# Patient Record
Sex: MALE | Race: WHITE | NOT HISPANIC OR LATINO | Employment: OTHER | URBAN - METROPOLITAN AREA
[De-identification: names, ages, dates, MRNs, and addresses within clinical notes are randomized per-mention and may not be internally consistent; named-entity substitution may affect disease eponyms.]

---
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
## 2023-03-20 ENCOUNTER — TELEPHONE (OUTPATIENT)
Dept: PEDIATRICS | Facility: CLINIC | Age: 18
End: 2023-03-20

## 2023-03-20 NOTE — TELEPHONE ENCOUNTER
Pt mom messaged to schedule a well check appointment. Will you still see him , for a final well check, since he is now 17?

## 2023-04-10 ENCOUNTER — TELEPHONE (OUTPATIENT)
Dept: PEDIATRICS | Facility: CLINIC | Age: 18
End: 2023-04-10
Payer: MEDICAID

## 2023-04-10 NOTE — TELEPHONE ENCOUNTER
Spoke to pt grandmother. Appointment will be rescheduled as requested to 4/20 at 8:40am. Verbalized understanding

## 2023-04-10 NOTE — TELEPHONE ENCOUNTER
----- Message from Migel Macias sent at 4/10/2023 12:03 PM CDT -----  Regarding: reschedule  Contact: NO GARCIA [[id number]]  Type:  Sooner Appointment Request    Caller is requesting a sooner appointment.  Caller declined first available appointment listed below.  Caller will not accept being placed on the waitlist and is requesting a message be sent to doctor.    Name of Caller:  Morena, Grandmother    When is the first available appointment?  Dept book due to visit modifiers    Symptoms:  Well Child    Best Call Back Number:  [phone]    Additional Information: Please call to advise.

## 2023-04-20 ENCOUNTER — PATIENT MESSAGE (OUTPATIENT)
Dept: PEDIATRICS | Facility: CLINIC | Age: 18
End: 2023-04-20

## 2023-04-20 ENCOUNTER — OFFICE VISIT (OUTPATIENT)
Dept: PEDIATRICS | Facility: CLINIC | Age: 18
End: 2023-04-20
Payer: MEDICAID

## 2023-04-20 VITALS
BODY MASS INDEX: 23.43 KG/M2 | HEART RATE: 89 BPM | RESPIRATION RATE: 19 BRPM | WEIGHT: 158.19 LBS | DIASTOLIC BLOOD PRESSURE: 69 MMHG | HEIGHT: 69 IN | SYSTOLIC BLOOD PRESSURE: 110 MMHG

## 2023-04-20 DIAGNOSIS — Z00.00 WELL ADULT EXAM: Primary | ICD-10-CM

## 2023-04-20 DIAGNOSIS — F84.0 AUTISM: ICD-10-CM

## 2023-04-20 PROCEDURE — 99999 PR PBB SHADOW E&M-EST. PATIENT-LVL III: CPT | Mod: PBBFAC,,, | Performed by: PEDIATRICS

## 2023-04-20 PROCEDURE — 3074F PR MOST RECENT SYSTOLIC BLOOD PRESSURE < 130 MM HG: ICD-10-PCS | Mod: CPTII,,, | Performed by: PEDIATRICS

## 2023-04-20 PROCEDURE — 99999 PR PBB SHADOW E&M-EST. PATIENT-LVL III: ICD-10-PCS | Mod: PBBFAC,,, | Performed by: PEDIATRICS

## 2023-04-20 PROCEDURE — 99395 PREV VISIT EST AGE 18-39: CPT | Mod: 25,S$PBB,, | Performed by: PEDIATRICS

## 2023-04-20 PROCEDURE — 92551 PURE TONE HEARING TEST AIR: CPT | Mod: ,,, | Performed by: PEDIATRICS

## 2023-04-20 PROCEDURE — 99395 PR PREVENTIVE VISIT,EST,18-39: ICD-10-PCS | Mod: 25,S$PBB,, | Performed by: PEDIATRICS

## 2023-04-20 PROCEDURE — 99177 PR OCULAR INSTRUMNT SCREEN W/ONSITE ANALYSIS BIL: ICD-10-PCS | Mod: ,,, | Performed by: PEDIATRICS

## 2023-04-20 PROCEDURE — 3008F BODY MASS INDEX DOCD: CPT | Mod: CPTII,,, | Performed by: PEDIATRICS

## 2023-04-20 PROCEDURE — 99213 OFFICE O/P EST LOW 20 MIN: CPT | Mod: PBBFAC,PO | Performed by: PEDIATRICS

## 2023-04-20 PROCEDURE — 99177 OCULAR INSTRUMNT SCREEN BIL: CPT | Mod: ,,, | Performed by: PEDIATRICS

## 2023-04-20 PROCEDURE — 3078F DIAST BP <80 MM HG: CPT | Mod: CPTII,,, | Performed by: PEDIATRICS

## 2023-04-20 PROCEDURE — 3074F SYST BP LT 130 MM HG: CPT | Mod: CPTII,,, | Performed by: PEDIATRICS

## 2023-04-20 PROCEDURE — 92551 PR PURE TONE HEARING TEST, AIR: ICD-10-PCS | Mod: ,,, | Performed by: PEDIATRICS

## 2023-04-20 PROCEDURE — 1159F MED LIST DOCD IN RCRD: CPT | Mod: CPTII,,, | Performed by: PEDIATRICS

## 2023-04-20 PROCEDURE — 1159F PR MEDICATION LIST DOCUMENTED IN MEDICAL RECORD: ICD-10-PCS | Mod: CPTII,,, | Performed by: PEDIATRICS

## 2023-04-20 PROCEDURE — 3078F PR MOST RECENT DIASTOLIC BLOOD PRESSURE < 80 MM HG: ICD-10-PCS | Mod: CPTII,,, | Performed by: PEDIATRICS

## 2023-04-20 PROCEDURE — 3008F PR BODY MASS INDEX (BMI) DOCUMENTED: ICD-10-PCS | Mod: CPTII,,, | Performed by: PEDIATRICS

## 2023-04-20 NOTE — PROGRESS NOTES
"Subjective:       History was provided by the grandfather.    Remington Donnelly is a 17 y.o. male who is here for this well-child visit.    Current Issues:  Current concerns include no complaints. He has autism.  Doing well.  Needs paperwork filled out for medicaid to continue home services once a month.     Review of Nutrition:  Current diet: regular for age  Balanced diet? yes    Social Screening:   Parental relations: lives with maternal grandparents  Sibling relations: sister Isabella  Discipline concerns? no  Concerns regarding behavior with peers? no    Screening Questions:  Risk factors for anemia: no  Risk factors for vision problems: no  Risk factors for hearing problems: no  Risk factors for tuberculosis: no  Risk factors for dyslipidemia: no  Risk factors for sexually-transmitted infections: no  Risk factors for alcohol/drug use:  no    Growth parameters: Noted and are appropriate for age.    Review of Systems  Pertinent items are noted in HPI      Objective:        Vitals:    04/20/23 0833   BP: 110/69   Pulse: 89   Resp: 19   Weight: 71.7 kg (158 lb 2.9 oz)   Height: 5' 8.75" (1.746 m)     General:   alert, appears stated age, and cooperative   Gait:   normal   Skin:   normal   Oral cavity:   lips, mucosa, and tongue normal; teeth and gums normal   Eyes:   sclerae white, pupils equal and reactive, red reflex normal bilaterally   Ears:   normal bilaterally   Neck:   no adenopathy and thyroid not enlarged, symmetric, no tenderness/mass/nodules   Lungs:  clear to auscultation bilaterally   Heart:   regular rate and rhythm, S1, S2 normal, no murmur, click, rub or gallop   Abdomen:  soft, non-tender; bowel sounds normal; no masses,  no organomegaly   :  exam deferred   Phil Stage:   deferred   Extremities:  extremities normal, atraumatic, no cyanosis or edema   Neuro:  Grossly normal        Assessment:     Encounter Diagnoses   Name Primary?    Well adult exam Yes    Autism         Plan:      1. Anticipatory " "guidance discussed.  Specific topics reviewed: importance of regular exercise and importance of varied diet.    2.  Weight management:  The patient was counseled regarding nutrition, physical activity.    3. Immunizations today:  UTD    4.  Paperwork completed for medicaid home services which he gets once a month.  Service is called "[facility]" through medicaid.    "

## 2024-02-23 ENCOUNTER — TELEPHONE (OUTPATIENT)
Dept: PEDIATRICS | Facility: CLINIC | Age: 19
End: 2024-02-23
Payer: MEDICAID

## 2024-02-23 NOTE — TELEPHONE ENCOUNTER
Spoke with guardian, informed her that per Dr. Valdez and Mgmt he would need to be seen at [facility]. Guardian understood  ----- Message from Annia Morse sent at 2/23/2024  7:18 AM CST -----  Regarding: appt request  Contact: mom  Type:  Needs Medical Advice    Who Called: pt  Would the patient rather a call back or a response via [facility]? Call back  Best Call Back Number: [phone]    Additional Information: sts she would like to get him a well check appt Monday 2/26/24 along with his sister who is scheduled for 9 am

## 2024-04-15 NOTE — ANESTHESIA PREPROCEDURE EVALUATION
04/19/2018  Remington Donnelly is a 12 y.o., male.    Anesthesia Evaluation    I have reviewed the Patient Summary Reports.    I have reviewed the Nursing Notes.   I have reviewed the Medications.     Review of Systems  Anesthesia Hx:  No problems with previous Anesthesia    Social:  Non-Smoker    Cardiovascular:  Cardiovascular Normal     Pulmonary:  Pulmonary Normal    Renal/:  Renal/ Normal     Neurological:  Neurology Normal    Endocrine:  Endocrine Normal    Psych:   Psychiatric History Autism  ADHD - untreated         Physical Exam  General:  Well nourished    Airway/Jaw/Neck:  Airway Findings: Mouth Opening: Normal Tongue: Normal  General Airway Assessment: Adult  Oropharynx Findings:  Mallampati: II  Jaw/Neck Findings:  Neck ROM: Normal ROM     Eyes/Ears/Nose:  Eyes/Ears/Nose Findings:    Dental:  Dental Findings:   Chest/Lungs:  Chest/Lungs Findings: Normal Respiratory Rate     Heart/Vascular:  Heart Findings: Rate: Normal  Rhythm: Regular Rhythm        Mental Status:  Mental Status Findings:  Cooperative, Alert and Oriented         Anesthesia Plan  Type of Anesthesia, risks & benefits discussed:  Anesthesia Type:  general  Patient's Preference:   Intra-op Monitoring Plan:   Intra-op Monitoring Plan Comments:   Post Op Pain Control Plan: multimodal analgesia  Post Op Pain Control Plan Comments:   Induction:   IV  Beta Blocker:  Patient is not currently on a Beta-Blocker (No further documentation required).       Informed Consent: Patient understands risks and agrees with Anesthesia plan.  Questions answered. Anesthesia consent signed with patient.  ASA Score: 2     Day of Surgery Review of History & Physical:  There are no significant changes.   H&P completed by Anesthesiologist.       Ready For Surgery From Anesthesia Perspective.       
hide

## 2024-10-08 ENCOUNTER — TELEPHONE (OUTPATIENT)
Dept: FAMILY MEDICINE | Facility: CLINIC | Age: 19
End: 2024-10-08
Payer: MEDICAID

## 2024-10-08 NOTE — TELEPHONE ENCOUNTER
----- Message from Ashley sent at 10/8/2024 11:13 AM CDT -----  Regarding: appointment  Contact: Destiney farmer  Type:  Sooner Appointment Request    Caller is requesting a sooner appointment.  Caller declined first available appointment listed below.  Caller will not accept being placed on the waitlist and is requesting a message be sent to doctor.    Name of Caller:  Destiney mother  When is the first available appointment?    Symptoms:  get established  Would the patient rather a call back or a response via [facility]? call  Best Call Back Number:  [phone] (home)     Additional Information:  Please call mother to advise.  Thanks!

## 2025-03-18 ENCOUNTER — OFFICE VISIT (OUTPATIENT)
Dept: PRIMARY CARE CLINIC | Facility: CLINIC | Age: 20
End: 2025-03-18
Payer: MEDICAID

## 2025-03-18 VITALS
HEART RATE: 81 BPM | DIASTOLIC BLOOD PRESSURE: 68 MMHG | WEIGHT: 162.94 LBS | HEIGHT: 68 IN | SYSTOLIC BLOOD PRESSURE: 115 MMHG | BODY MASS INDEX: 24.7 KG/M2 | OXYGEN SATURATION: 96 %

## 2025-03-18 DIAGNOSIS — F84.0 AUTISM: Primary | ICD-10-CM

## 2025-03-18 DIAGNOSIS — Z00.00 ENCOUNTER FOR ANNUAL PHYSICAL EXAM: ICD-10-CM

## 2025-03-18 DIAGNOSIS — Z11.4 ENCOUNTER FOR SCREENING FOR HUMAN IMMUNODEFICIENCY VIRUS (HIV): ICD-10-CM

## 2025-03-18 DIAGNOSIS — Z11.59 NEED FOR HEPATITIS C SCREENING TEST: ICD-10-CM

## 2025-03-18 PROCEDURE — 3078F DIAST BP <80 MM HG: CPT | Mod: CPTII,,,

## 2025-03-18 PROCEDURE — 99213 OFFICE O/P EST LOW 20 MIN: CPT | Mod: PBBFAC,PN

## 2025-03-18 PROCEDURE — 99204 OFFICE O/P NEW MOD 45 MIN: CPT | Mod: S$PBB,,,

## 2025-03-18 PROCEDURE — 1160F RVW MEDS BY RX/DR IN RCRD: CPT | Mod: CPTII,,,

## 2025-03-18 PROCEDURE — 3074F SYST BP LT 130 MM HG: CPT | Mod: CPTII,,,

## 2025-03-18 PROCEDURE — 1159F MED LIST DOCD IN RCRD: CPT | Mod: CPTII,,,

## 2025-03-18 PROCEDURE — 99999 PR PBB SHADOW E&M-EST. PATIENT-LVL III: CPT | Mod: PBBFAC,,,

## 2025-03-18 PROCEDURE — 3008F BODY MASS INDEX DOCD: CPT | Mod: CPTII,,,

## 2025-03-18 NOTE — PROGRESS NOTES
SUBJECTIVE     Chief Complaint   Patient presents with    [facility]  Remington Donnelly is a 19 y.o. male presents to clinic with mother. Patient with medical diagnoses as listed in the medical history and problem list that presents for annual exam. Pt is new to me. He has a good appetite and eats well. He does exercise. He sleeps for ~7+ hours nightly. Pt does take OTC supplements. He does have any current stressors. Pt is UTD on age appropriate CA screening. Dental exam is not UTD. Eye exam is not UTD.     HPI    History of Present Illness    CHIEF COMPLAINT:  Patient presents today for establishment of care and completion of 90-L form    BEHAVIORAL HEALTH:  Mom reports patient at times becomes easily frustrated when questioned. His diet significantly affects his behavior - when consuming foods that deviate from his restricted diet, he experiences increased frustration, potentially becoming combative, followed by a depressive crash. His mother reports that a gluten-free, protein-free, and chemical-free diet has been beneficial, particularly when he was younger. As he has gotten older, occasional dietary deviations have less severe behavioral impacts.    SLEEP:  He typically sleeps 7 hours per night, going to bed around 10 PM and waking up at 5 AM for school. He occasionally wakes up earlier than scheduled to access electronic devices. He takes melatonin nightly for sleep aid.    MEDICATIONS AND SUPPLEMENTS:  He takes a multivitamin, OPC3 (an antioxidant supplement), vitamin D3, and melatonin.    MEDICAL HISTORY:  He denies any recent hospitalizations. Last labs were performed in 2014, with only basic checkups since then. His last dental exam was over a year ago when wisdom teeth were extracted, and his last eye exam was also performed over a year ago.      ROS:  General: -fever, -chills, -fatigue, -weight gain, -weight loss  Eyes: -vision changes, -redness, -discharge  ENT: -ear pain, -nasal congestion,  "-sore throat  Cardiovascular: -chest pain, -palpitations, -lower extremity edema  Respiratory: -cough, -shortness of breath  Gastrointestinal: -abdominal pain, -nausea, -vomiting, -diarrhea, -constipation, -blood in stool  Genitourinary: -dysuria, -hematuria, -frequency  Musculoskeletal: -joint pain, -muscle pain  Skin: -rash, -lesion  Neurological: -headache, -dizziness, -numbness, -tingling  Psychiatric: -anxiety, -depression, -sleep difficulty, +irritability         PAST MEDICAL HISTORY:  Past Medical History:   Diagnosis Date    ADHD (attention deficit hyperactivity disorder)     Autism spectrum disorder        PAST SURGICAL HISTORY:  Past Surgical History:   Procedure Laterality Date    no family history of anesthesia problems         SOCIAL HISTORY:  Social History[1]    FAMILY HISTORY:  Family History   Problem Relation Name Age of Onset    Psoriasis Paternal Aunt          plaque psoriasis    Lupus Maternal Grandmother      Cancer Other MGGF         asbestosis    Hypertension Other MGGM     Allergies Mother karrie     Kidney disease Sister Susan        ALLERGIES AND MEDICATIONS: updated and reviewed.  Review of patient's allergies indicates:   Allergen Reactions    No known drug allergies      Current Medications[2]      OBJECTIVE     Physical Exam  Vitals:    03/18/25 0920   BP: 115/68   Pulse: 81    Body mass index is 24.77 kg/m².  Weight: 73.9 kg (162 lb 14.7 oz)   Height: 5' 8" (172.7 cm)     Physical Exam  Vitals reviewed.   Constitutional:       General: He is awake. He is not in acute distress.     Appearance: Normal appearance.   HENT:      Right Ear: External ear normal.      Left Ear: External ear normal.      Nose: Nose normal.      Mouth/Throat:      Mouth: Mucous membranes are moist.   Eyes:      Extraocular Movements: Extraocular movements intact.      Conjunctiva/sclera: Conjunctivae normal.      Pupils: Pupils are equal, round, and reactive to light.   Cardiovascular:      Rate and Rhythm: " Normal rate and regular rhythm.      Pulses: Normal pulses.      Heart sounds: Normal heart sounds.   Pulmonary:      Effort: Pulmonary effort is normal.      Breath sounds: Normal breath sounds.   Abdominal:      General: Bowel sounds are normal. There is no distension.      Palpations: Abdomen is soft.   Musculoskeletal:         General: No swelling. Normal range of motion.      Cervical back: Normal range of motion.   Skin:     General: Skin is warm and dry.      Findings: No rash.   Neurological:      General: No focal deficit present.      Mental Status: He is alert.   Psychiatric:         Attention and Perception: He is inattentive.         Mood and Affect: Mood normal.         Speech: Speech is delayed.         Behavior: Behavior normal.         Cognition and Memory: Cognition is impaired.      Comments: Patient sitting calmly in chair on his cellphone.              Health Maintenance         Date Due Completion Date    Hepatitis C Screening Never done ---    HIV Screening Never done ---    Influenza Vaccine (1) Never done ---    COVID-19 Vaccine (1 - 2024-25 season) Never done ---    TETANUS VACCINE 07/13/2027 7/13/2017    RSV Vaccine (Age 60+ and Pregnant patients) (1 - 1-dose 75+ series) 12/09/2078 ---          ASSESSMENT     19 y.o. male with     1. Autism    2. Encounter for annual physical exam    3. Need for hepatitis C screening test    4. Encounter for screening for human immunodeficiency virus (HIV)        PLAN:     1. Autism  Stable. Completed 90-L form.     2. Encounter for annual physical exam  - Discussed age and gender appropriate screenings at this visit and encouraged a healthy diet low in simple carbohydrates, and increased physical activity.  Counseled on medically appropriate vaccines based on age and current health condition.  Screening test reviewed and discussed with patient.    - Hemoglobin A1C; Future  - Lipid Panel; Future  - TSH; Future  - Comprehensive Metabolic Panel; Future  -  CBC Auto Differential; Future    3. Need for hepatitis C screening test  Due. Ordered.   - Hepatitis C Antibody; Future    4. Encounter for screening for human immunodeficiency virus (HIV)  Due. Ordered.   - HIV 1/2 Ag/Ab (4th Gen); Future      Assessment & Plan    IMPRESSION:  - Assessed overall health status and developmental progress.  - Noted no recent primary care visit or labs.  - Determined need for comprehensive lab panel to evaluate nutritional status and overall health.  - Considered autism diagnosis and associated behavioral patterns in care planning.    SLEEP DISORDERS:  - Continue melatonin administration at night for sleep.  - Patient sleeps approximately 7 hours per night, from around 10 PM to 5 AM.  - Prescribed melatonin to be taken at night to aid with sleep.    BEHAVIORAL AND EMOTIONAL DISORDERS:  - Patient experiences stress, frustration, and combative behavior, especially when questioned or after consuming certain foods.  - Assessed the patient's behavior using a frequency scale (seldom, frequently, always) for various symptoms including depression, confusion, wandering, hostility, and combativeness.  - Evaluated the patient's behavior and emotional state through a series of questions about orientation, forgetfulness, depression, confusion, wandering, hostility, and combativeness.  - Continue to follow a specific diet to manage behavior and emotional state, noting that the previously strict gluten-free, protein-free, and chemical-free diet is now less rigidly followed.    VITAMIN D DEFICIENCY:  - Patient is currently taking vitamin D supplements along with other vitamins.    GENERAL HEALTH MANAGEMENT:  - Ordered labs including a comprehensive metabolic panel to assess the patient's overall health and nutrient levels.  - Patient to continue current exercise routine including dog walks, bike rides, and trampoline use.  - Continue multivitamin and OPC3 (antioxidant supplement).         Em  Otto, MSN, APRN, FNP-C  [facility]  03/18/2025 9:31 AM    I spent a total of 45 minutes on the day of the visit.This includes face to face time and non-face to face time preparing to see the patient (eg, review of tests), obtaining and/or reviewing separately obtained history, documenting clinical information in the electronic or other health record, independently interpreting results and communicating results to the patient/family/caregiver, or care coordinator.     This note was generated with the assistance of ambient listening technology. Verbal consent was obtained by the patient and accompanying visitor(s) for the recording of patient appointment to facilitate this note. I attest to having reviewed and edited the generated note for accuracy, though some syntax or spelling errors may persist. Please contact the author of this note for any clarification.    Follow up if symptoms worsen or fail to improve.         [1]   Social History  Socioeconomic History    Marital status: Single   Tobacco Use    Smoking status: Never    Smokeless tobacco: Never   Social History Narrative    Lives with mom, mgm, mgf. 2 cats. +smokers, mom smokes. 10th grade at [employer] (2020-21) (Special classes)   [2]   Current Outpatient Medications   Medication Sig Dispense Refill    multivitamin (ONE DAILY MULTIVITAMIN) per tablet Take 1 tablet by mouth once daily.       No current facility-administered medications for this visit.

## 2025-03-19 ENCOUNTER — LAB VISIT (OUTPATIENT)
Dept: LAB | Facility: HOSPITAL | Age: 20
End: 2025-03-19
Payer: MEDICAID

## 2025-03-19 DIAGNOSIS — Z11.4 ENCOUNTER FOR SCREENING FOR HUMAN IMMUNODEFICIENCY VIRUS (HIV): ICD-10-CM

## 2025-03-19 DIAGNOSIS — Z00.00 ENCOUNTER FOR ANNUAL PHYSICAL EXAM: ICD-10-CM

## 2025-03-19 DIAGNOSIS — Z11.59 NEED FOR HEPATITIS C SCREENING TEST: ICD-10-CM

## 2025-03-19 LAB
ALBUMIN SERPL BCP-MCNC: 4.6 G/DL (ref 3.5–5.2)
ALP SERPL-CCNC: 97 U/L (ref 40–150)
ALT SERPL W/O P-5'-P-CCNC: 69 U/L (ref 10–44)
ANION GAP SERPL CALC-SCNC: 10 MMOL/L (ref 8–16)
AST SERPL-CCNC: 32 U/L (ref 10–40)
BASOPHILS # BLD AUTO: 0.02 K/UL (ref 0–0.2)
BASOPHILS NFR BLD: 0.4 % (ref 0–1.9)
BILIRUB SERPL-MCNC: 1.1 MG/DL (ref 0.1–1)
BUN SERPL-MCNC: 14 MG/DL (ref 6–20)
CALCIUM SERPL-MCNC: 9.6 MG/DL (ref 8.7–10.5)
CHLORIDE SERPL-SCNC: 107 MMOL/L (ref 95–110)
CHOLEST SERPL-MCNC: 115 MG/DL (ref 120–199)
CHOLEST/HDLC SERPL: 3.1 {RATIO} (ref 2–5)
CO2 SERPL-SCNC: 22 MMOL/L (ref 23–29)
CREAT SERPL-MCNC: 0.9 MG/DL (ref 0.5–1.4)
DIFFERENTIAL METHOD BLD: ABNORMAL
EOSINOPHIL # BLD AUTO: 0.1 K/UL (ref 0–0.5)
EOSINOPHIL NFR BLD: 2.2 % (ref 0–8)
ERYTHROCYTE [DISTWIDTH] IN BLOOD BY AUTOMATED COUNT: 11.4 % (ref 11.5–14.5)
EST. GFR  (NO RACE VARIABLE): >60 ML/MIN/1.73 M^2
ESTIMATED AVG GLUCOSE: 91 MG/DL (ref 68–131)
GLUCOSE SERPL-MCNC: 94 MG/DL (ref 70–110)
HBA1C MFR BLD: 4.8 % (ref 4.5–6.2)
HCT VFR BLD AUTO: 46.4 % (ref 40–54)
HCV AB SERPL QL IA: NEGATIVE
HDLC SERPL-MCNC: 37 MG/DL (ref 40–75)
HDLC SERPL: 32.2 % (ref 20–50)
HGB BLD-MCNC: 16.1 G/DL (ref 14–18)
HIV 1+2 AB+HIV1 P24 AG SERPL QL IA: NEGATIVE
IMM GRANULOCYTES # BLD AUTO: 0.02 K/UL (ref 0–0.04)
IMM GRANULOCYTES NFR BLD AUTO: 0.4 % (ref 0–0.5)
LDLC SERPL CALC-MCNC: 60.2 MG/DL (ref 63–159)
LYMPHOCYTES # BLD AUTO: 1.8 K/UL (ref 1–4.8)
LYMPHOCYTES NFR BLD: 32.1 % (ref 18–48)
MCH RBC QN AUTO: 29.1 PG (ref 27–31)
MCHC RBC AUTO-ENTMCNC: 34.7 G/DL (ref 32–36)
MCV RBC AUTO: 84 FL (ref 82–98)
MONOCYTES # BLD AUTO: 0.5 K/UL (ref 0.3–1)
MONOCYTES NFR BLD: 9.5 % (ref 4–15)
NEUTROPHILS # BLD AUTO: 3.1 K/UL (ref 1.8–7.7)
NEUTROPHILS NFR BLD: 55.4 % (ref 38–73)
NONHDLC SERPL-MCNC: 78 MG/DL
NRBC BLD-RTO: 0 /100 WBC
PLATELET # BLD AUTO: 208 K/UL (ref 150–450)
PMV BLD AUTO: 11.7 FL (ref 9.2–12.9)
POTASSIUM SERPL-SCNC: 4.1 MMOL/L (ref 3.5–5.1)
PROT SERPL-MCNC: 7.7 G/DL (ref 6–8.4)
RBC # BLD AUTO: 5.53 M/UL (ref 4.6–6.2)
SODIUM SERPL-SCNC: 139 MMOL/L (ref 136–145)
TRIGL SERPL-MCNC: 89 MG/DL (ref 30–150)
TSH SERPL DL<=0.005 MIU/L-ACNC: 0.84 UIU/ML (ref 0.4–4)
WBC # BLD AUTO: 5.57 K/UL (ref 3.9–12.7)

## 2025-03-19 PROCEDURE — 86803 HEPATITIS C AB TEST: CPT

## 2025-03-19 PROCEDURE — 87389 HIV-1 AG W/HIV-1&-2 AB AG IA: CPT

## 2025-03-19 PROCEDURE — 84443 ASSAY THYROID STIM HORMONE: CPT

## 2025-03-19 PROCEDURE — 80061 LIPID PANEL: CPT

## 2025-03-19 PROCEDURE — 80053 COMPREHEN METABOLIC PANEL: CPT

## 2025-03-19 PROCEDURE — 83036 HEMOGLOBIN GLYCOSYLATED A1C: CPT

## 2025-03-19 PROCEDURE — 85025 COMPLETE CBC W/AUTO DIFF WBC: CPT

## 2025-04-08 ENCOUNTER — RESULTS FOLLOW-UP (OUTPATIENT)
Dept: PRIMARY CARE CLINIC | Facility: CLINIC | Age: 20
End: 2025-04-08
Payer: MEDICAID

## 2025-04-08 DIAGNOSIS — R74.01 ELEVATED ALT MEASUREMENT: Primary | ICD-10-CM

## 2025-04-22 NOTE — PROGRESS NOTES
Attempted to contact patient's mother regarding labs. No answer to available phone number. Left VM with call back information.

## 2025-04-22 NOTE — PROGRESS NOTES
Spoke with patient's mother regarding recent lab results. Notified that liver enzyme (ALT) is elevated at 69 (normal is 10-44). Elevated ALT levels can be caused excessive alcohol consumption, overuse of tylenol/acetaminophen, thyroid disease, overweight, cirrhosis, and hepatitis to name a few.   Symptoms such as fatigue, jaundice, abdominal pain, or swelling may accompany elevated ALT levels. Will recheck hepatic panel in 8 weeks. Counseled parent regarding dietary changes. Parent verbalized understanding.

## 2025-07-17 ENCOUNTER — TELEPHONE (OUTPATIENT)
Facility: CLINIC | Age: 20
End: 2025-07-17
Payer: MEDICAID

## 2025-07-28 NOTE — TELEPHONE ENCOUNTER
Attempted to contact caregiver regarding clearance letter for son to receive therapeutic horseback riding. No answer to available phone number. Left VM with call back information.

## 2025-08-01 NOTE — TELEPHONE ENCOUNTER
Attempt #2 to contact caregiver regarding clearance letter for son to receive therapeutic horseback riding. No answer to available phone number. Left VM with call back information.